# Patient Record
Sex: MALE | Race: WHITE | NOT HISPANIC OR LATINO | URBAN - METROPOLITAN AREA
[De-identification: names, ages, dates, MRNs, and addresses within clinical notes are randomized per-mention and may not be internally consistent; named-entity substitution may affect disease eponyms.]

---

## 2017-04-29 ENCOUNTER — OUTPATIENT (OUTPATIENT)
Dept: OUTPATIENT SERVICES | Facility: HOSPITAL | Age: 52
LOS: 1 days | Discharge: HOME | End: 2017-04-29

## 2017-06-28 DIAGNOSIS — R10.9 UNSPECIFIED ABDOMINAL PAIN: ICD-10-CM

## 2017-07-03 ENCOUNTER — OUTPATIENT (OUTPATIENT)
Dept: OUTPATIENT SERVICES | Facility: HOSPITAL | Age: 52
LOS: 1 days | Discharge: HOME | End: 2017-07-03

## 2017-07-03 DIAGNOSIS — N40.1 BENIGN PROSTATIC HYPERPLASIA WITH LOWER URINARY TRACT SYMPTOMS: ICD-10-CM

## 2018-02-09 ENCOUNTER — TRANSCRIPTION ENCOUNTER (OUTPATIENT)
Age: 53
End: 2018-02-09

## 2018-03-23 ENCOUNTER — OUTPATIENT (OUTPATIENT)
Dept: OUTPATIENT SERVICES | Facility: HOSPITAL | Age: 53
LOS: 1 days | Discharge: HOME | End: 2018-03-23

## 2018-03-23 DIAGNOSIS — N40.1 BENIGN PROSTATIC HYPERPLASIA WITH LOWER URINARY TRACT SYMPTOMS: ICD-10-CM

## 2018-04-22 ENCOUNTER — EMERGENCY (EMERGENCY)
Facility: HOSPITAL | Age: 53
LOS: 0 days | Discharge: HOME | End: 2018-04-22
Admitting: PHYSICIAN ASSISTANT

## 2018-04-22 VITALS
SYSTOLIC BLOOD PRESSURE: 160 MMHG | OXYGEN SATURATION: 96 % | RESPIRATION RATE: 20 BRPM | DIASTOLIC BLOOD PRESSURE: 79 MMHG | HEART RATE: 97 BPM | TEMPERATURE: 100 F

## 2018-04-22 DIAGNOSIS — M25.512 PAIN IN LEFT SHOULDER: ICD-10-CM

## 2018-04-22 DIAGNOSIS — J45.909 UNSPECIFIED ASTHMA, UNCOMPLICATED: ICD-10-CM
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
## 2018-04-22 DIAGNOSIS — Z79.82 LONG TERM (CURRENT) USE OF ASPIRIN: ICD-10-CM

## 2018-04-22 DIAGNOSIS — I25.10 ATHEROSCLEROTIC HEART DISEASE OF NATIVE CORONARY ARTERY WITHOUT ANGINA PECTORIS: ICD-10-CM

## 2018-04-22 DIAGNOSIS — K21.9 GASTRO-ESOPHAGEAL REFLUX DISEASE WITHOUT ESOPHAGITIS: ICD-10-CM

## 2018-04-22 DIAGNOSIS — Z79.899 OTHER LONG TERM (CURRENT) DRUG THERAPY: ICD-10-CM
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
## 2018-04-22 DIAGNOSIS — I10 ESSENTIAL (PRIMARY) HYPERTENSION: ICD-10-CM
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
## 2018-04-22 RX ORDER — ACETAMINOPHEN 500 MG
975 TABLET ORAL ONCE
Qty: 0 | Refills: 0 | Status: COMPLETED | OUTPATIENT
Start: 2018-04-22 | End: 2018-04-22

## 2018-04-22 RX ORDER — ALBUTEROL 90 UG/1
0 AEROSOL, METERED ORAL
Qty: 0 | Refills: 0 | COMMUNITY

## 2018-04-22 RX ORDER — PANTOPRAZOLE SODIUM 20 MG/1
1 TABLET, DELAYED RELEASE ORAL
Qty: 0 | Refills: 0 | COMMUNITY

## 2018-04-22 RX ORDER — BUDESONIDE AND FORMOTEROL FUMARATE DIHYDRATE 160; 4.5 UG/1; UG/1
0 AEROSOL RESPIRATORY (INHALATION)
Qty: 0 | Refills: 0 | COMMUNITY

## 2018-04-22 RX ORDER — ASPIRIN/CALCIUM CARB/MAGNESIUM 324 MG
0 TABLET ORAL
Qty: 0 | Refills: 0 | COMMUNITY

## 2018-04-22 RX ADMIN — Medication 975 MILLIGRAM(S): at 12:32

## 2018-04-22 NOTE — ED ADULT NURSE NOTE - ASSOCIATED SYMPTOMS
Left shoulder pain, Pt states he felt left shoulder pop when lifting bags of groceries and shutting garage door.

## 2018-04-22 NOTE — ED PROVIDER NOTE - PHYSICAL EXAMINATION
Physical Exam    Vital Signs: I have reviewed the initial vital signs.  Constitutional: well-nourished, appears stated age, no acute distress  Skin: No rash, warm, dry  Muskuloskeletal: left shoulder: +tender and swelling. decreased active movement to shoulder. Able to passively range with decreased ROM. No ecchymosis. n/v intact  Neuro: AOx3, Motor 5/5, Sensation: equal and intact

## 2018-04-22 NOTE — ED PROVIDER NOTE - OBJECTIVE STATEMENT
51 yo male hx of HTN, CAD,  spine sx, left rotator cuff injury here c/o left shoulder pain and feeling a pop after lifting groceries today. No numbenss/tingling. Right hand dominant.

## 2018-04-22 NOTE — ED PROVIDER NOTE - NS ED ROS FT
Constitutional: (-) fever  Skin: No rash  Muskuloskeletal: left shoulder pain  Neurological: (-) altered mental status

## 2018-10-30 PROBLEM — K21.9 GASTRO-ESOPHAGEAL REFLUX DISEASE WITHOUT ESOPHAGITIS: Chronic | Status: ACTIVE | Noted: 2018-04-22

## 2018-10-30 PROBLEM — J45.909 UNSPECIFIED ASTHMA, UNCOMPLICATED: Chronic | Status: ACTIVE | Noted: 2018-04-22

## 2018-10-30 PROBLEM — I10 ESSENTIAL (PRIMARY) HYPERTENSION: Chronic | Status: ACTIVE | Noted: 2018-04-22

## 2018-11-01 ENCOUNTER — OUTPATIENT (OUTPATIENT)
Dept: OUTPATIENT SERVICES | Facility: HOSPITAL | Age: 53
LOS: 1 days | Discharge: HOME | End: 2018-11-01

## 2018-11-01 DIAGNOSIS — N40.1 BENIGN PROSTATIC HYPERPLASIA WITH LOWER URINARY TRACT SYMPTOMS: ICD-10-CM

## 2019-08-14 ENCOUNTER — EMERGENCY (EMERGENCY)
Facility: HOSPITAL | Age: 54
LOS: 0 days | Discharge: HOME | End: 2019-08-15
Attending: EMERGENCY MEDICINE | Admitting: EMERGENCY MEDICINE
Payer: COMMERCIAL

## 2019-08-14 VITALS
HEART RATE: 96 BPM | SYSTOLIC BLOOD PRESSURE: 130 MMHG | HEIGHT: 73 IN | OXYGEN SATURATION: 96 % | RESPIRATION RATE: 18 BRPM | WEIGHT: 288.81 LBS | TEMPERATURE: 97 F | DIASTOLIC BLOOD PRESSURE: 82 MMHG

## 2019-08-14 VITALS
SYSTOLIC BLOOD PRESSURE: 124 MMHG | OXYGEN SATURATION: 96 % | TEMPERATURE: 98 F | RESPIRATION RATE: 20 BRPM | HEART RATE: 81 BPM | DIASTOLIC BLOOD PRESSURE: 72 MMHG

## 2019-08-14 DIAGNOSIS — R10.9 UNSPECIFIED ABDOMINAL PAIN: ICD-10-CM

## 2019-08-14 DIAGNOSIS — R10.11 RIGHT UPPER QUADRANT PAIN: ICD-10-CM

## 2019-08-14 DIAGNOSIS — K76.0 FATTY (CHANGE OF) LIVER, NOT ELSEWHERE CLASSIFIED: ICD-10-CM

## 2019-08-14 DIAGNOSIS — R10.13 EPIGASTRIC PAIN: ICD-10-CM

## 2019-08-14 LAB
ALBUMIN SERPL ELPH-MCNC: 4.6 G/DL — SIGNIFICANT CHANGE UP (ref 3.5–5.2)
ALP SERPL-CCNC: 97 U/L — SIGNIFICANT CHANGE UP (ref 30–115)
ALT FLD-CCNC: 42 U/L — HIGH (ref 0–41)
ANION GAP SERPL CALC-SCNC: 14 MMOL/L — SIGNIFICANT CHANGE UP (ref 7–14)
APPEARANCE UR: CLEAR — SIGNIFICANT CHANGE UP
AST SERPL-CCNC: 27 U/L — SIGNIFICANT CHANGE UP (ref 0–41)
BASOPHILS # BLD AUTO: 0.07 K/UL — SIGNIFICANT CHANGE UP (ref 0–0.2)
BASOPHILS NFR BLD AUTO: 1 % — SIGNIFICANT CHANGE UP (ref 0–1)
BILIRUB SERPL-MCNC: 0.4 MG/DL — SIGNIFICANT CHANGE UP (ref 0.2–1.2)
BILIRUB UR-MCNC: NEGATIVE — SIGNIFICANT CHANGE UP
BUN SERPL-MCNC: 17 MG/DL — SIGNIFICANT CHANGE UP (ref 10–20)
CALCIUM SERPL-MCNC: 9.5 MG/DL — SIGNIFICANT CHANGE UP (ref 8.5–10.1)
CHLORIDE SERPL-SCNC: 103 MMOL/L — SIGNIFICANT CHANGE UP (ref 98–110)
CO2 SERPL-SCNC: 23 MMOL/L — SIGNIFICANT CHANGE UP (ref 17–32)
COLOR SPEC: YELLOW — SIGNIFICANT CHANGE UP
CREAT SERPL-MCNC: 1 MG/DL — SIGNIFICANT CHANGE UP (ref 0.7–1.5)
DIFF PNL FLD: NEGATIVE — SIGNIFICANT CHANGE UP
EOSINOPHIL # BLD AUTO: 0.11 K/UL — SIGNIFICANT CHANGE UP (ref 0–0.7)
EOSINOPHIL NFR BLD AUTO: 1.5 % — SIGNIFICANT CHANGE UP (ref 0–8)
GLUCOSE SERPL-MCNC: 100 MG/DL — HIGH (ref 70–99)
GLUCOSE UR QL: NEGATIVE — SIGNIFICANT CHANGE UP
HCT VFR BLD CALC: 38.2 % — LOW (ref 42–52)
HGB BLD-MCNC: 12.1 G/DL — LOW (ref 14–18)
IMM GRANULOCYTES NFR BLD AUTO: 0.3 % — SIGNIFICANT CHANGE UP (ref 0.1–0.3)
KETONES UR-MCNC: NEGATIVE — SIGNIFICANT CHANGE UP
LEUKOCYTE ESTERASE UR-ACNC: NEGATIVE — SIGNIFICANT CHANGE UP
LIDOCAIN IGE QN: 48 U/L — SIGNIFICANT CHANGE UP (ref 7–60)
LYMPHOCYTES # BLD AUTO: 2.45 K/UL — SIGNIFICANT CHANGE UP (ref 1.2–3.4)
LYMPHOCYTES # BLD AUTO: 34.3 % — SIGNIFICANT CHANGE UP (ref 20.5–51.1)
MCHC RBC-ENTMCNC: 25.6 PG — LOW (ref 27–31)
MCHC RBC-ENTMCNC: 31.7 G/DL — LOW (ref 32–37)
MCV RBC AUTO: 80.8 FL — SIGNIFICANT CHANGE UP (ref 80–94)
MONOCYTES # BLD AUTO: 0.43 K/UL — SIGNIFICANT CHANGE UP (ref 0.1–0.6)
MONOCYTES NFR BLD AUTO: 6 % — SIGNIFICANT CHANGE UP (ref 1.7–9.3)
NEUTROPHILS # BLD AUTO: 4.07 K/UL — SIGNIFICANT CHANGE UP (ref 1.4–6.5)
NEUTROPHILS NFR BLD AUTO: 56.9 % — SIGNIFICANT CHANGE UP (ref 42.2–75.2)
NITRITE UR-MCNC: NEGATIVE — SIGNIFICANT CHANGE UP
NRBC # BLD: 0 /100 WBCS — SIGNIFICANT CHANGE UP (ref 0–0)
PH UR: 6 — SIGNIFICANT CHANGE UP (ref 5–8)
PLATELET # BLD AUTO: 166 K/UL — SIGNIFICANT CHANGE UP (ref 130–400)
POTASSIUM SERPL-MCNC: 4.3 MMOL/L — SIGNIFICANT CHANGE UP (ref 3.5–5)
POTASSIUM SERPL-SCNC: 4.3 MMOL/L — SIGNIFICANT CHANGE UP (ref 3.5–5)
PROT SERPL-MCNC: 6.9 G/DL — SIGNIFICANT CHANGE UP (ref 6–8)
PROT UR-MCNC: SIGNIFICANT CHANGE UP
RBC # BLD: 4.73 M/UL — SIGNIFICANT CHANGE UP (ref 4.7–6.1)
RBC # FLD: 13.5 % — SIGNIFICANT CHANGE UP (ref 11.5–14.5)
SODIUM SERPL-SCNC: 140 MMOL/L — SIGNIFICANT CHANGE UP (ref 135–146)
SP GR SPEC: 1.03 — HIGH (ref 1.01–1.02)
UROBILINOGEN FLD QL: SIGNIFICANT CHANGE UP
WBC # BLD: 7.15 K/UL — SIGNIFICANT CHANGE UP (ref 4.8–10.8)
WBC # FLD AUTO: 7.15 K/UL — SIGNIFICANT CHANGE UP (ref 4.8–10.8)

## 2019-08-14 PROCEDURE — 76705 ECHO EXAM OF ABDOMEN: CPT | Mod: 26

## 2019-08-14 PROCEDURE — 99284 EMERGENCY DEPT VISIT MOD MDM: CPT

## 2019-08-14 PROCEDURE — 74177 CT ABD & PELVIS W/CONTRAST: CPT | Mod: 26

## 2019-08-14 RX ORDER — SODIUM CHLORIDE 9 MG/ML
1000 INJECTION INTRAMUSCULAR; INTRAVENOUS; SUBCUTANEOUS ONCE
Refills: 0 | Status: COMPLETED | OUTPATIENT
Start: 2019-08-14 | End: 2019-08-14

## 2019-08-14 RX ADMIN — SODIUM CHLORIDE 1000 MILLILITER(S): 9 INJECTION INTRAMUSCULAR; INTRAVENOUS; SUBCUTANEOUS at 21:45

## 2019-08-15 NOTE — ED PROVIDER NOTE - PHYSICAL EXAMINATION
CONSTITUTIONAL: Well-appearing; well-nourished; in no apparent distress.   EYES: PERRL; EOM intact.   ENT: normal nose; no rhinorrhea; normal pharynx with no tonsillar hypertrophy.   NECK: Supple; non-tender; no cervical lymphadenopathy. No JVD.   CARDIOVASCULAR: Normal S1, S2; no murmurs, rubs, or gallops.   RESPIRATORY: Normal chest excursion with respiration; breath sounds clear and equal bilaterally; no wheezes, rhonchi, or rales.  GI/: + RUQ/epigastric tenderness. Normal bowel sounds; non-distended; no palpable organomegaly.   MS: No evidence of trauma or deformity. no midline tenderness to neck/back.   SKIN: Normal for age and race; warm; dry; good turgor; no apparent lesions or exudate.   NEURO/PSYCH: A & O x 4; grossly unremarkable.

## 2019-08-15 NOTE — ED PROVIDER NOTE - CLINICAL SUMMARY MEDICAL DECISION MAKING FREE TEXT BOX
54M pmhx gerd, htn, p/w ruq abd pain radiating to the back, 4/10, had an episode 2 days ago and today. VS wnl, PE unremarkable. CT and US negative for acute pathology. Pain likely msk in nature. DC home.

## 2019-08-15 NOTE — ED PROVIDER NOTE - CARE PLAN
Principal Discharge DX:	Abdominal pain  Secondary Diagnosis:	Flank pain  Secondary Diagnosis:	Fatty liver

## 2019-08-15 NOTE — ED PROVIDER NOTE - ATTENDING CONTRIBUTION TO CARE
HPI-As noted above, interviewed the patient myself & agreed w/ findings, additionally: 54M pmhx gerd, htn, p/w ruq abd pain radiating to the back, 4/10, had an episode 2 days ago and today. Currently asymptomatic.     ROS- As noted above & additionally:   (Positive): abd pain    (Negative):  fever, chills, n/v, cp,  pleuritic cp, sob, palpitations, diaphoresis, cough, ha/dizziness, numbness/tingling, neck pain/ stiffness, diarrhea, constipation, melena/brbpr, urinary symptoms, trauma, weakness, edema, calf pain/swelling/erythema, sick contacts, recent travel or rash.    Vital Signs: I have reviewed the initial VS.     PE- As noted above additionally:  General: Awake, alert, (-) acute distress  Eyes: PERRL, EOMI, nl  lids & conjunctivae, (-) icterus  ENT: nl ext inspection, pink/moist membranes, pharynx nl, (-) pharyngeal erythema/exudate  CV: S1S2, RRR, 2+pulses b/l, warm/well-perfused, (-) edema, (-) murmur/gallops/rubs/JVD  Respiratory: CTAB, nl RR/effort, (-) resp distress, wheezing/rales/rhonchi, nl voice, speaking full sentences, no retractions, no stridor  Abdomen: Soft, nl BS, (-)tender, (-)distended/guarding/rebound/CVA tenderness  Musculoskeletal: FROM all 4 extremities, N/V intact, pelvis stable, (-) TLS spinal tender/deform/step-offs, (-)janak tender/deform, stable gait  Neck: FROM neck, supple, trachea midline (-)meningismus, (-) c-spine tender/step-offs/lymphadenopathy  Integumentary: nl color for race, warm and dry, (-)rash  Neuro: Oriented x3, CN 2-12 grossly intact motor/sensory/gait/cerebellar  Psych: Oriented x3, nl normal/affect    LABS/ IMAGING-pUS/ Lab/ CT

## 2019-08-15 NOTE — ED PROVIDER NOTE - NSFOLLOWUPINSTRUCTIONS_ED_ALL_ED_FT
Abdominal Pain    Many things can cause abdominal pain. Usually, abdominal pain is not caused by a disease and will improve without treatment. Your health care provider will do a physical exam and possibly order blood tests and imaging to help determine the seriousness of your pain. However, in many cases, no cause may be found and you may need further testing as an outpatient. Monitor your abdominal pain for any changes.     SEEK IMMEDIATE MEDICAL CARE IF YOU HAVE THE FOLLOWING SYMPTOMS: worsening abdominal pain, vomiting, diarrhea, inability to have bowel movements or pass gas, black or bloody stool, fever accompanying chest pain or back pain, or dizziness/lightheadedness.    Flank Pain    Flank pain is pain that is located on the side of the body between the upper abdomen and the back. This area is called the flank. The pain may occur over a short period of time (acute), or it may be long-term or recurring (chronic). It may be mild or severe. Flank pain can be caused by many things, including:    Muscle soreness or injury.  Kidney stones or kidney disease.  Stress.  A disease of the spine (vertebral disk disease).  A lung infection (pneumonia).  Fluid around the lungs (pulmonary edema).  A skin rash caused by the chickenpox virus (shingles).  Tumors that affect the back of the abdomen.  Gallbladder disease.    Follow these instructions at home:  Drink enough fluid to keep your urine clear or pale yellow.  Rest as told by your health care provider.  Take over-the-counter and prescription medicines only as told by your health care provider.  Keep a journal to track what has caused your flank pain and what has made it feel better.  ImageKeep all follow-up visits as told by your health care provider. This is important.    Contact a health care provider if:  Your pain is not controlled with medicine.  You have new symptoms.  Your pain gets worse.  You have a fever.  Your symptoms last longer than 2–3 days.  You have trouble urinating or you are urinating very frequently.    Get help right away if:  You have trouble breathing or you are short of breath.  Your abdomen hurts or it is swollen or red.  You have nausea or vomiting.  You feel faint or you pass out.  You have blood in your urine.    Summary  Flank pain is pain that is located on the side of the body between the upper abdomen and the back.  The pain may occur over a short period of time (acute), or it may be long-term or recurring (chronic). It may be mild or severe.  Flank pain can be caused by many things.  Contact your health care provider if your symptoms get worse or they last longer than 2–3 days.  This information is not intended to replace advice given to you by your health care provider. Make sure you discuss any questions you have with your health care provider

## 2019-08-15 NOTE — ED PROVIDER NOTE - CARE PROVIDER_API CALL
Roger Ludwig (MD)  Gastroenterology  [address] [place] [address]  Phone: [phone]  Fax: [phone]  Follow Up Time:

## 2019-08-16 RX ORDER — CEFPODOXIME PROXETIL 100 MG
1 TABLET ORAL
Qty: 20 | Refills: 0
Start: 2019-08-16 | End: 2019-08-25

## 2019-08-16 NOTE — ED POST DISCHARGE NOTE - DETAILS
Pt still with dysuria and some flank discomfort. No NV, fever. Will treat. Rx for cefpodoxime sent to pts pharmacy

## 2019-12-11 PROBLEM — Z00.00 ENCOUNTER FOR PREVENTIVE HEALTH EXAMINATION: Status: ACTIVE | Noted: 2019-12-11

## 2020-02-06 NOTE — ED PROVIDER NOTE - OBJECTIVE STATEMENT
53 yo male hx of HTN/HLD/Asthma present c/o upper abdominal pain radiating to flank for few days. also reported few episodes of dysuria over the past few days. pain to stomach/back worsen with movement. Denies fever/chill/nausea/vomiting/diarrhea/change of appetite/constipation and urinary frequency and urgency. Denies HA/dizziness/chest pain/sob/palpitations and pain to extremities and ambulatory difficulty. no

## 2021-09-17 ENCOUNTER — FORM ENCOUNTER (OUTPATIENT)
Age: 56
End: 2021-09-17

## 2021-09-18 ENCOUNTER — TRANSCRIPTION ENCOUNTER (OUTPATIENT)
Age: 56
End: 2021-09-18

## 2021-09-20 ENCOUNTER — OUTPATIENT (OUTPATIENT)
Dept: INPATIENT UNIT | Facility: HOSPITAL | Age: 56
LOS: 1 days | Discharge: HOME | End: 2021-09-20

## 2021-09-20 ENCOUNTER — APPOINTMENT (OUTPATIENT)
Age: 56
End: 2021-09-20

## 2021-09-20 VITALS
TEMPERATURE: 98 F | DIASTOLIC BLOOD PRESSURE: 87 MMHG | HEART RATE: 83 BPM | OXYGEN SATURATION: 97 % | RESPIRATION RATE: 17 BRPM | SYSTOLIC BLOOD PRESSURE: 132 MMHG

## 2021-09-20 VITALS
RESPIRATION RATE: 16 BRPM | TEMPERATURE: 99 F | HEART RATE: 83 BPM | DIASTOLIC BLOOD PRESSURE: 71 MMHG | SYSTOLIC BLOOD PRESSURE: 119 MMHG | OXYGEN SATURATION: 97 %

## 2021-09-20 DIAGNOSIS — U07.1 COVID-19: ICD-10-CM

## 2021-09-20 RX ORDER — SODIUM CHLORIDE 9 MG/ML
250 INJECTION INTRAMUSCULAR; INTRAVENOUS; SUBCUTANEOUS
Refills: 0 | Status: COMPLETED | OUTPATIENT
Start: 2021-09-20 | End: 2021-09-20

## 2021-09-20 RX ADMIN — SODIUM CHLORIDE 25 MILLILITER(S): 9 INJECTION INTRAMUSCULAR; INTRAVENOUS; SUBCUTANEOUS at 10:58

## 2021-09-20 NOTE — CHART NOTE - NSCHARTNOTEFT_GEN_A_CORE
56 y.o. male pmh of asthma well controlled by symbicort and spiriva with ventolin for rescue here for MAB infusion. Pt tested positive for covid 19 with rapid testing on saturday. The day prior he woke up in the middle of the night with chills and nightsweats. Denies fever, cp, sob, cough, n/v, abdominal pain, palpitations, pluerisy.      ROS  Constitutional: (-) fever (+) chill) (-) lightheadedness   Eyes/ENT: (-) blurry vision, (-) epistaxis (-) rhinorrhea (-) nasal congestion  Cardiovascular: (-) chest pain, (-) syncope (-) palpitations   Respiratory: (-) cough, (-) shortness of breath (-) pleurisy   Gastrointestinal: (-) vomiting, (-) diarrhea (-) abdominal pain (-) nausea (-) anorexia  Musculoskeletal: (-) neck pain, (-) back pain, (-) joint pain (-) joint swelling (-) painful ROM  Integumentary: (-) rash, (-) edema (-) lacerations (-) pruritis   Neurological: (-) headache, (-) altered mental status (-) LOC (-) dizziness (-) paresthesias (-) gait abnormalities       Physical Exam    Vital Signs: I have reviewed the initial vital signs.  Constitutional: well-nourished, appears stated age, no acute distress  Eyes: Conjunctiva pink, Sclera clear,   Cardiovascular: S1 and S2, regular rate, regular rhythm, well-perfused extremities, radial pulses equal and 2+, calves nonttp, equal in size  Respiratory: unlabored respiratory effort, speaking in full sentences, handling oral secretions, 98% on RA , clear to auscultation bilaterally no wheezing, rales and rhonchi  Integumentary: warm, dry, no rashes, lacerations,  Neurologic: awake, alert    progress: Informed consent obtained. pt began infusion at 9:38 AM, will complete in 21 minutes followed by 1 hour observation. Given copy of return precautions and additional paperwork.

## 2022-07-08 ENCOUNTER — APPOINTMENT (OUTPATIENT)
Dept: ORTHOPEDIC SURGERY | Facility: CLINIC | Age: 57
End: 2022-07-08

## 2022-07-08 VITALS — HEIGHT: 74 IN | WEIGHT: 277 LBS | BODY MASS INDEX: 35.55 KG/M2

## 2022-07-08 DIAGNOSIS — J45.909 UNSPECIFIED ASTHMA, UNCOMPLICATED: ICD-10-CM

## 2022-07-08 DIAGNOSIS — Z78.9 OTHER SPECIFIED HEALTH STATUS: ICD-10-CM

## 2022-07-08 PROCEDURE — 99214 OFFICE O/P EST MOD 30 MIN: CPT

## 2022-07-08 RX ORDER — AMLODIPINE BESYLATE AND BENAZEPRIL HYDROCHLORIDE 10; 40 MG/1; MG/1
10-40 CAPSULE ORAL
Qty: 90 | Refills: 0 | Status: ACTIVE | COMMUNITY
Start: 2022-05-05

## 2022-07-08 RX ORDER — NEOMYCIN SULFATE, POLYMYXIN B SULFATE AND DEXAMETHASONE 3.5; 10000; 1 MG/ML; [USP'U]/ML; MG/ML
3.5-10000-0.1 SUSPENSION OPHTHALMIC
Qty: 5 | Refills: 0 | Status: ACTIVE | COMMUNITY
Start: 2022-06-28

## 2022-07-08 RX ORDER — METFORMIN HYDROCHLORIDE 1000 MG/1
1000 TABLET, COATED ORAL
Qty: 180 | Refills: 0 | Status: ACTIVE | COMMUNITY
Start: 2022-05-04

## 2022-07-08 RX ORDER — DICYCLOMINE HYDROCHLORIDE 20 MG/1
20 TABLET ORAL
Qty: 360 | Refills: 0 | Status: ACTIVE | COMMUNITY
Start: 2021-11-29

## 2022-07-08 RX ORDER — TAMSULOSIN HYDROCHLORIDE 0.4 MG/1
0.4 CAPSULE ORAL
Qty: 90 | Refills: 0 | Status: ACTIVE | COMMUNITY
Start: 2022-02-04

## 2022-07-08 RX ORDER — OLOPATADINE HYDROCHLORIDE 665 UG/1
0.6 SPRAY, METERED NASAL
Qty: 30 | Refills: 0 | Status: ACTIVE | COMMUNITY
Start: 2022-06-08

## 2022-07-08 RX ORDER — PRAVASTATIN SODIUM 20 MG/1
20 TABLET ORAL
Qty: 90 | Refills: 0 | Status: ACTIVE | COMMUNITY
Start: 2021-09-15

## 2022-07-08 RX ORDER — MAGNESIUM HYDROXIDE 1200 MG/15ML
0.9 LIQUID ORAL
Qty: 3000 | Refills: 0 | Status: ACTIVE | COMMUNITY
Start: 2022-02-02

## 2022-07-08 NOTE — ASSESSMENT
[FreeTextEntry1] :   I reviewed EMG NCV with patient.  He has moderate on the right and mild on the left carpal tunnel.\par \par We discussed the recommendation for carpal tunnel surgery- We reviewed that the goal of surgery is to prevent worsening and give the nerve a chance to recover. If symptoms are constant there is a chance that the nerve is already too badly damaged and no longer has the potential to recover.Risks, benefits, and alternatives of surgery discussed with patient (and family).Risks including but not limited to infection, blood loss, tendon damage, muscle damage, nerve damage, stiffness, pain, no resolution of symptoms, CRPS, loss of function, potential for secondary surgery, loss of limb or life.Patient understands and was allowed to voice questions or concerns.They agree to surgery\par \par We again reviewed the anatomy of the flexor sheath and pathology of trigger fingers with the use of drawings and discussion.  The patient has failed injections/nonoperative treatment.  We discussed the possibility of surgery including the use of an open trigger finger release.  We discussed that too many injections may lead to weakening of the tendon/tendon rupture and that surgery is indicated at this point.  Risks include bleeding, infection, injury to nerves, vessels, tendons, stiffness, pain, loss of range of motion, loss of function, CRPS, and risks and complications of anesthesia.  We discussed the surgical plan and post op expectations.  We also discussed the possibility of prolonged pain/scar tissue and the possible need for therapy.  The patient is amenable to the risks, had the opportunity to ask questions, all questions were answered and the patient signed consent of their own accord.  They will be contacted by my surgical scheduler.\par \par   The patient would like to do the right side 1st and then the left side.  When we do the left side we will do the trigger finger releases at the same time.

## 2022-07-08 NOTE — IMAGING
[de-identified] : L hand: \par Tender volar wrist \par Good finger ROM \par +Tinels \par + durkans test \par normal sensation median nerve distribution\par \par L hand: \par Mild swelling \par Tender 3rd and 4th A1 pulley \par Decreased long and ring ROM \par + triggering\par \par R hand: \par Good finger ROM \par +Tinels \par +durkans test \par normal sensation median nerve distribution\par

## 2022-07-08 NOTE — HISTORY OF PRESENT ILLNESS
[de-identified] : Patient comes in for follow-up of his his bilateral carpal tunnel.  Had an EMG NCV.  The left long and ring fingers are still locking and triggering on him.  He has had 2 injections for both and they do not help him.

## 2022-07-11 ENCOUNTER — APPOINTMENT (OUTPATIENT)
Dept: ORTHOPEDIC SURGERY | Facility: AMBULATORY SURGERY CENTER | Age: 57
End: 2022-07-11

## 2022-07-11 PROCEDURE — 29848 WRIST ENDOSCOPY/SURGERY: CPT | Mod: RT

## 2022-07-15 ENCOUNTER — APPOINTMENT (OUTPATIENT)
Dept: ORTHOPEDIC SURGERY | Facility: CLINIC | Age: 57
End: 2022-07-15

## 2022-07-15 PROCEDURE — 99024 POSTOP FOLLOW-UP VISIT: CPT

## 2022-07-15 NOTE — ASSESSMENT
[FreeTextEntry1] :  status post right endoscopic carpal tunnel release.  The patient is doing significantly better.  The pain is better.  I put Steri-Strips over the wound.  He will start scar massage once it falls off.  If he has any problems he will let me know.  I will see him back in about 3-4 weeks.  In addition he will do the left side in the future but right now he has some things coming up that he cannot move forward with surgery for this as of yet.

## 2022-07-15 NOTE — HISTORY OF PRESENT ILLNESS
[de-identified] : Patient comes in status post right endoscopic carpal tunnel release.  He is doing well.  The nighttime pain is better.  The numbness and tingling has improved but still has

## 2022-07-15 NOTE — PHYSICAL EXAM
[de-identified] :  right upper extremity:  incision site clean dry and intact, sutures removed, full range of motion of fingers, slight tingling in the fingers, otherwise neurovascularly intact

## 2022-08-05 ENCOUNTER — APPOINTMENT (OUTPATIENT)
Dept: ORTHOPEDIC SURGERY | Facility: CLINIC | Age: 57
End: 2022-08-05

## 2022-08-05 PROCEDURE — 99024 POSTOP FOLLOW-UP VISIT: CPT

## 2022-08-05 NOTE — PHYSICAL EXAM
[de-identified] :  right upper extremity: full range of motion of fingers, slightly tender to palpation at pillar site, slight tingling in the fingers, otherwise neurovascularly intact

## 2022-08-05 NOTE — HISTORY OF PRESENT ILLNESS
[] : Post Surgical Visit: yes [de-identified] : Patient comes in status post right endoscopic carpal tunnel release.  He is doing well.  The nighttime pain is better.  The numbness and tingling has improved but still hasTingling [de-identified] : 7/11/2022 [de-identified] : right endoscopic carpal tunnel release

## 2022-08-05 NOTE — ASSESSMENT
[FreeTextEntry1] :  patient is status post right endoscopic carpal tunnel release.  He is doing better.  Discussed with the patient the numbness and tingling can take a year to improve may never fully improved.  I believe the pain in the pillar area will resolve with time.  He will follow up in 6 weeks for re-evaluation.  We will then discuss surgery for the left side.

## 2022-09-23 ENCOUNTER — APPOINTMENT (OUTPATIENT)
Dept: ORTHOPEDIC SURGERY | Facility: CLINIC | Age: 57
End: 2022-09-23

## 2022-09-23 PROCEDURE — 99214 OFFICE O/P EST MOD 30 MIN: CPT | Mod: 24

## 2022-09-23 NOTE — HISTORY OF PRESENT ILLNESS
[] : Post Surgical Visit: yes [de-identified] : Patient comes in status post right endoscopic carpal tunnel release.  He is doing well.  The nighttime pain is better.  The numbness and tingling has improved but still hasTingling.  Patient says he is doing a lot better than prior pin the numbness and tingling is better.  Sometimes he has a sharp pain in the palm.  In addition he is interested in moving forward with surgery for the left side.  Still having numbness and tingling in locking of the long and ring fingers. [de-identified] : 7/11/2022 [de-identified] : right endoscopic carpal tunnel release

## 2022-09-23 NOTE — PHYSICAL EXAM
[de-identified] :  right upper extremity: full range of motion of fingers, slightly tender to palpation at pillar site, slight tingling in the fingers, otherwise neurovascularly intact\par \par   Left upper extremity:  positive Tinel's, positive Durkan's, positive triggering of the long and ring fingers, neurovascularly intact

## 2022-09-23 NOTE — ASSESSMENT
[FreeTextEntry1] :  status post right endoscopic carpal tunnel release.  The patient is doing well.  The numbness and tingling is improving.  He would like to move forward with surgical intervention left-hand side.  I went through risks benefits alternatives.  I discussed with him trigger finger release as well.  My surgical scheduler will get in touch with him.\par \par We discussed the recommendation for carpal tunnel surgery- We reviewed that the goal of surgery is to prevent worsening and give the nerve a chance to recover. If symptoms are constant there is a chance that the nerve is already too badly damaged and no longer has the potential to recover.Risks, benefits, and alternatives of surgery discussed with patient (and family).Risks including but not limited to infection, blood loss, tendon damage, muscle damage, nerve damage, stiffness, pain, no resolution of symptoms, CRPS, loss of function, potential for secondary surgery, loss of limb or life.Patient understands and was allowed to voice questions or concerns.They agree to surgery\par \par We again reviewed the anatomy of the flexor sheath and pathology of trigger fingers with the use of drawings and discussion.  The patient has failed injections/nonoperative treatment.  We discussed the possibility of surgery including the use of an open trigger finger release.  We discussed that too many injections may lead to weakening of the tendon/tendon rupture and that surgery is indicated at this point.  Risks include bleeding, infection, injury to nerves, vessels, tendons, stiffness, pain, loss of range of motion, loss of function, CRPS, and risks and complications of anesthesia.  We discussed the surgical plan and post op expectations.  We also discussed the possibility of prolonged pain/scar tissue and the possible need for therapy.  The patient is amenable to the risks, had the opportunity to ask questions, all questions were answered and the patient signed consent of their own accord.  They will be contacted by my surgical scheduler.\par

## 2022-12-12 ENCOUNTER — APPOINTMENT (OUTPATIENT)
Dept: ORTHOPEDIC SURGERY | Facility: AMBULATORY SURGERY CENTER | Age: 57
End: 2022-12-12

## 2022-12-12 PROCEDURE — 26055 INCISE FINGER TENDON SHEATH: CPT | Mod: F2

## 2022-12-12 PROCEDURE — 29848 WRIST ENDOSCOPY/SURGERY: CPT | Mod: LT

## 2022-12-16 ENCOUNTER — APPOINTMENT (OUTPATIENT)
Dept: ORTHOPEDIC SURGERY | Facility: CLINIC | Age: 57
End: 2022-12-16

## 2022-12-16 PROCEDURE — 99024 POSTOP FOLLOW-UP VISIT: CPT

## 2022-12-16 NOTE — ASSESSMENT
[FreeTextEntry1] : Patient comes in status post left endoscopic carpal tunnel release long finger trigger finger releases.  He is doing well.  He does not haints.  The numbness and tingling significantly better but still has some decreased sensation in the tip of the thumb.  He has good range of motion of the fingers.\par \par   Left upper extremity:  Incisions is clean dry and intact, mild ecchymosis, able to make full fist, decreased sensation the very tip of the thumb, neurovascular intact\par \par  patient is doing well.  The sutures of the carpal tunnel removed.  He will return next week for suture removal for the trigger fingers.  He will work on scar massage soon.  I will see him back then.  If he has any problems he will let me know.

## 2022-12-21 ENCOUNTER — APPOINTMENT (OUTPATIENT)
Dept: ORTHOPEDIC SURGERY | Facility: CLINIC | Age: 57
End: 2022-12-21
Payer: MEDICARE

## 2022-12-21 PROCEDURE — 99024 POSTOP FOLLOW-UP VISIT: CPT

## 2022-12-21 NOTE — ASSESSMENT
[FreeTextEntry1] : Patient comes back status post left endoscopic carpal tunnel release long finger ring finger trigger finger releases.  He is doing well.  He is here for suture removal for the trigger fingers.  He does not have complaints otherwise today.\par \par   The left upper extremity: Incision site clean dry and intact, sutures removed, slightly decreased range of motion of fingers, neurovascular status improved however slightly decreased on thumb,\par \par  status post left endoscopic carpal tunnel release and long finger trigger finger and ring finger trigger finger releases.  Patient is doing well.  Sutures removed.  He is going to start with scar massage.  I will follow up with me in 3 weeks.  If he has any problems he will let me know.

## 2022-12-23 ENCOUNTER — APPOINTMENT (OUTPATIENT)
Dept: ORTHOPEDIC SURGERY | Facility: CLINIC | Age: 57
End: 2022-12-23

## 2023-01-13 ENCOUNTER — APPOINTMENT (OUTPATIENT)
Dept: ORTHOPEDIC SURGERY | Facility: CLINIC | Age: 58
End: 2023-01-13
Payer: COMMERCIAL

## 2023-01-13 DIAGNOSIS — M65.332 TRIGGER FINGER, LEFT MIDDLE FINGER: ICD-10-CM

## 2023-01-13 DIAGNOSIS — M65.342 TRIGGER FINGER, LEFT RING FINGER: ICD-10-CM

## 2023-01-13 DIAGNOSIS — G56.03 CARPAL TUNNEL SYNDROM,BILATERAL UPPER LIMBS: ICD-10-CM

## 2023-01-13 PROCEDURE — 99024 POSTOP FOLLOW-UP VISIT: CPT

## 2023-01-13 NOTE — ASSESSMENT
[FreeTextEntry1] : Patient comes back status post left endoscopic carpal tunnel release long finger ring finger trigger finger releases.  He is doing well.  He says sometimes the long and ring fingers feel tight when he pushes them down and stretches them.  He has been working on scar massage.  He still has a little bit of numbness in the thumb but all the other fingers are better.\par \par   The left upper extremity: Incision sites   Well-healed,  full range of motion of fingers, neurovascular status improved however slightly decreased on thumb,\par \par  status post left endoscopic carpal tunnel release and long finger trigger finger and ring finger trigger finger releases.  Patient is doing well.   he is going scar massage.  If he has any issues he will return to see me.  He is doing quite well.

## 2024-08-20 ENCOUNTER — NON-APPOINTMENT (OUTPATIENT)
Age: 59
End: 2024-08-20

## 2025-02-01 ENCOUNTER — NON-APPOINTMENT (OUTPATIENT)
Age: 60
End: 2025-02-01

## 2025-02-11 ENCOUNTER — NON-APPOINTMENT (OUTPATIENT)
Age: 60
End: 2025-02-11

## 2025-03-31 ENCOUNTER — NON-APPOINTMENT (OUTPATIENT)
Age: 60
End: 2025-03-31

## 2025-08-15 ENCOUNTER — APPOINTMENT (OUTPATIENT)
Facility: CLINIC | Age: 60
End: 2025-08-15